# Patient Record
Sex: FEMALE | Race: WHITE | NOT HISPANIC OR LATINO | Employment: OTHER | URBAN - METROPOLITAN AREA
[De-identification: names, ages, dates, MRNs, and addresses within clinical notes are randomized per-mention and may not be internally consistent; named-entity substitution may affect disease eponyms.]

---

## 2017-04-10 ENCOUNTER — OFFICE VISIT (OUTPATIENT)
Dept: NEUROLOGY | Facility: MEDICAL CENTER | Age: 82
End: 2017-04-10
Payer: MEDICARE

## 2017-04-10 VITALS
SYSTOLIC BLOOD PRESSURE: 138 MMHG | DIASTOLIC BLOOD PRESSURE: 70 MMHG | HEIGHT: 62 IN | OXYGEN SATURATION: 96 % | BODY MASS INDEX: 22.34 KG/M2 | TEMPERATURE: 97.2 F | RESPIRATION RATE: 16 BRPM | HEART RATE: 68 BPM | WEIGHT: 121.4 LBS

## 2017-04-10 DIAGNOSIS — G25.0 TREMOR, ESSENTIAL: ICD-10-CM

## 2017-04-10 DIAGNOSIS — R25.1 TREMOR: ICD-10-CM

## 2017-04-10 PROCEDURE — 4004F PT TOBACCO SCREEN RCVD TLK: CPT | Mod: 8P | Performed by: PSYCHIATRY & NEUROLOGY

## 2017-04-10 PROCEDURE — 99214 OFFICE O/P EST MOD 30 MIN: CPT | Performed by: PSYCHIATRY & NEUROLOGY

## 2017-04-10 PROCEDURE — 4040F PNEUMOC VAC/ADMIN/RCVD: CPT | Mod: 8P | Performed by: PSYCHIATRY & NEUROLOGY

## 2017-04-10 PROCEDURE — 1101F PT FALLS ASSESS-DOCD LE1/YR: CPT | Mod: 8P | Performed by: PSYCHIATRY & NEUROLOGY

## 2017-04-10 PROCEDURE — G8420 CALC BMI NORM PARAMETERS: HCPCS | Performed by: PSYCHIATRY & NEUROLOGY

## 2017-04-10 PROCEDURE — G8432 DEP SCR NOT DOC, RNG: HCPCS | Performed by: PSYCHIATRY & NEUROLOGY

## 2017-04-10 RX ORDER — CLONAZEPAM 0.5 MG/1
TABLET ORAL
Qty: 60 TAB | Refills: 5 | Status: SHIPPED | OUTPATIENT
Start: 2017-04-10 | End: 2017-10-10 | Stop reason: SDUPTHER

## 2017-04-10 RX ORDER — GABAPENTIN 100 MG/1
200 CAPSULE ORAL 3 TIMES DAILY
Qty: 180 CAP | Refills: 5 | Status: SHIPPED | OUTPATIENT
Start: 2017-04-10 | End: 2018-05-29

## 2017-04-10 ASSESSMENT — PATIENT HEALTH QUESTIONNAIRE - PHQ9: CLINICAL INTERPRETATION OF PHQ2 SCORE: 0

## 2017-04-10 NOTE — ASSESSMENT & PLAN NOTE
PT HAS HAD A WORSENING OF HER TREMOR. Patient states that the gabapentin and the clonazepam aren't helping as well as the use to. The tremor has interfered with her ability to do certain I hand tasks and crafts. Patient feels like her balance is slightly off but she is practicing this with regular yoga. Patient is very motivated to maintain her independence and strength.

## 2017-04-10 NOTE — MR AVS SNAPSHOT
"Akua Wheeler   4/10/2017 9:40 AM   Office Visit   MRN: [id number]    Department:  Neurology [facility]   Dept Phone:  [phone]    Description:  Female : 1932   Provider:  Melissa P Bloch, M.D.           Reason for Visit     Follow-Up TREMOR      Allergies as of 4/10/2017     Allergen Noted Reactions    Actifed [Triprolidine-Pse] 2013       Bactrim 2013       Epinephrine 2013       Flagyl [Metronidazole Hcl] 2013       Hydrocortisone 2013       Pcn [Penicillins] 2013       Primidone 2014       Propranolol 2014         You were diagnosed with     Tremor, essential   [967680]       Tremor   [[id number]]         Vital Signs     Blood Pressure Pulse Temperature Respirations Height Weight    138/70 mmHg 68 36.2 °C (97.2 °F) 16 1.575 m (5' 2\") 55.067 kg (121 lb 6.4 oz)    Body Mass Index Oxygen Saturation Smoking Status             22.20 kg/m2 96% Never Assessed         Basic Information     Date Of Birth Sex Race Ethnicity Preferred Language    1932 Female White Non- English      Your appointments     Oct 10, 2017  9:40 AM   Follow Up Visit with Melissa P Bloch, M.D.   [facility] (--)    [address] [place] [id number] [phone]           You will be receiving a confirmation call a few days before your appointment from our automated call confirmation system.              Problem List              ICD-10-CM Priority Class Noted - Resolved    Tremor, essential G25.0   3/11/2014 - Present    Accidental fall W19.XXXA   3/17/2016 - Present      Health Maintenance        Date Due Completion Dates    IMM DTaP/Tdap/Td Vaccine (1 - Tdap) 1951 ---    PAP SMEAR 1953 ---    MAMMOGRAM 1972 ---    COLONOSCOPY 1982 ---    IMM ZOSTER VACCINE 1992 ---    BONE DENSITY 1997 ---    IMM PNEUMOCOCCAL 65+ (ADULT) LOW/MEDIUM RISK SERIES (1 of 2 - PCV13) 1997 ---            Current Immunizations     No " immunizations on file.      Below and/or attached are the medications your provider expects you to take. Review all of your home medications and newly ordered medications with your provider and/or pharmacist. Follow medication instructions as directed by your provider and/or pharmacist. Please keep your medication list with you and share with your provider. Update the information when medications are discontinued, doses are changed, or new medications (including over-the-counter products) are added; and carry medication information at all times in the event of emergency situations     Allergies:  ACTIFED - (reactions not documented)     BACTRIM - (reactions not documented)     EPINEPHRINE - (reactions not documented)     FLAGYL - (reactions not documented)     HYDROCORTISONE - (reactions not documented)     PCN - (reactions not documented)     PRIMIDONE - (reactions not documented)     PROPRANOLOL - (reactions not documented)               Medications  Valid as of: April 10, 2017 - 10:13 AM    Generic Name Brand Name Tablet Size Instructions for use    ALPRAZolam (Tab) XANAX 0.25 MG Take 0.25 mg by mouth at bedtime as needed.        ALPRAZolam (Tab) XANAX 0.25 MG Take 1 Tab by mouth at bedtime as needed for Sleep.        Calcium & Magnesium Carbonates   Take  by mouth.        Calcium Carbonate-Vitamin D (Tab) OSCAL-250 250-125 MG-UNIT Take 1 Tab by mouth every day.        Cholecalciferol (Cap) vitamin D3 5000 UNITS Take 1 Cap by mouth every day.        ClonazePAM (Tab) KLONOPIN 0.5 MG Take 0.5 Tabs by mouth 2 times a day.        ClonazePAM (Tab) KLONOPIN 0.5 MG Take 0.5 Tabs by mouth 2 times a day.        ClonazePAM (Tab) KLONOPIN 0.5 MG TAKE 1 TABLET BY MOUTH TWICE A DAY        Gabapentin (Cap) NEURONTIN 100 MG Take 2 Caps by mouth 3 times a day.        Ofloxacin (Solution) OCUFLOX 0.3 %         Omega-3 Fatty Acids   Take  by mouth.        PrednisoLONE Acetate (Suspension) PRED FORTE 1 %         Probiotic Product    Take  by mouth.        .                 Medicines prescribed today were sent to:     [id number] - [place], [place] - [address]    [address] [facility] [place] [address]    Phone: [phone] Fax: [phone]    Open 24 Hours?: No      Medication refill instructions:       If your prescription bottle indicates you have medication refills left, it is not necessary to call your provider’s office. Please contact your pharmacy and they will refill your medication.    If your prescription bottle indicates you do not have any refills left, you may request refills at any time through one of the following ways: The online Splurgy system (except Urgent Care), by calling your provider’s office, or by asking your pharmacy to contact your provider’s office with a refill request. Medication refills are processed only during regular business hours and may not be available until the next business day. Your provider may request additional information or to have a follow-up visit with you prior to refilling your medication.   *Please Note: Medication refills are assigned a new Rx number when refilled electronically. Your pharmacy may indicate that no refills were authorized even though a new prescription for the same medication is available at the pharmacy. Please request the medicine by name with the pharmacy before contacting your provider for a refill.           Splurgy Access Code: [email or web address]  Expires: 5/10/2017  9:29 AM    Splurgy  A secure, online tool to manage your health information     [employer]’s Splurgy® is a secure, online tool that connects you to your personalized health information from the privacy of your home -- day or night - making it very easy for you to manage your healthcare. Once the activation process is completed, you can even access your medical information using the Splurgy julia, which is available for free in the Apple Julia store or Google Play store.     Splurgy provides the following levels  of access (as shown below):   My Chart Features   [facility] Doctor [facility]  Specialists [facility]  Urgent  Care Non-[facility]  Primary Care  Doctor   Email your healthcare team securely and privately 24/7 X X X    Manage appointments: schedule your next appointment; view details of past/upcoming appointments X      Request prescription refills. X      View recent personal medical records, including lab and immunizations X X X X   View health record, including health history, allergies, medications X X X X   Read reports about your outpatient visits, procedures, consult and ER notes X X X X   See your discharge summary, which is a recap of your hospital and/or ER visit that includes your diagnosis, lab results, and care plan. X X       How to register for [employer]end:  1. Go to  [email or web address].  2. Click on the Sign Up Now box, which takes you to the New Member Sign Up page. You will need to provide the following information:  a. Enter your RecoVend Access Code exactly as it appears at the top of this page. (You will not need to use this code after you’ve completed the sign-up process. If you do not sign up before the expiration date, you must request a new code.)   b. Enter your date of birth.   c. Enter your home email address.   d. Click Submit, and follow the next screen’s instructions.  3. Create a RecoVend ID. This will be your RecoVend login ID and cannot be changed, so think of one that is secure and easy to remember.  4. Create a RecoVend password. You can change your password at any time.  5. Enter your Password Reset Question and Answer. This can be used at a later time if you forget your password.   6. Enter your e-mail address. This allows you to receive e-mail notifications when new information is available in RecoVend.  7. Click Sign Up. You can now view your health information.    For assistance activating your RecoVend account, call [phone]

## 2017-04-13 NOTE — PROGRESS NOTES
CHIEF COMPLAINT  Chief Complaint   Patient presents with   • Follow-Up     TREMOR       HPI  Akua Wheeler is a 83 y.o. female who presents for treatment of her tremor.    Tremor, essential  PT HAS HAD A WORSENING OF HER TREMOR. Patient states that the gabapentin and the clonazepam aren't helping as well as the use to. The tremor has interfered with her ability to do certain I hand tasks and crafts. Patient feels like her balance is slightly off but she is practicing this with regular yoga. Patient is very motivated to maintain her independence and strength.      REVIEW OF SYSTEMS  Pertinent Positives:hayfever, daytime somnolence, slight balance issues   All other systems are negative.     PAST MEDICAL HISTORY  Past Medical History   Diagnosis Date   • Allergy    • ASTHMA    • Cancer    • CATARACT    • Hyperlipidemia    • Muscle disorder    • OSTEOPOROSIS    • Thyroid disease        SOCIAL HISTORY  Social History     Social History   • Marital Status: Single     Spouse Name: N/A   • Number of Children: N/A   • Years of Education: N/A     Occupational History   • Not on file.     Social History Main Topics   • Smoking status: Not on file   • Smokeless tobacco: Not on file   • Alcohol Use: Not on file   • Drug Use: Not on file   • Sexual Activity: Not on file     Other Topics Concern   • Not on file     Social History Narrative   • No narrative on file       SURGICAL HISTORY  Past Surgical History   Procedure Laterality Date   • Hernia repair         CURRENT MEDICATIONS  Current Outpatient Prescriptions   Medication Sig Dispense Refill   • gabapentin (NEURONTIN) 100 MG Cap Take 2 Caps by mouth 3 times a day. 180 Cap 5   • clonazepam (KLONOPIN) 0.5 MG Tab TAKE 1 TABLET BY MOUTH TWICE A DAY 60 Tab 5   • Cholecalciferol (VITAMIN D3) 5000 UNITS Cap Take 1 Cap by mouth every day.     • Omega-3 Fatty Acids (OMEGA 3 PO) Take  by mouth.     • Probiotic Product (SOLUBLE FIBER/PROBIOTICS PO) Take  by mouth.     • CALCIUM &  "MAGNESIUM CARBONATES PO Take  by mouth.     • calcium-vitamin D (OSCAL-250) 250-125 MG-UNIT TABS Take 1 Tab by mouth every day.     • clonazepam (KLONOPIN) 0.5 MG TABS Take 0.5 Tabs by mouth 2 times a day. (Patient not taking: Reported on 10/17/2016) 60 Tab 5   • alprazolam (XANAX) 0.25 MG TABS Take 1 Tab by mouth at bedtime as needed for Sleep. (Patient not taking: Reported on 10/17/2016) 30 Tab 1   • ofloxacin (OCUFLOX) 0.3 % SOLN      • prednisoLONE acetate (PRED FORTE) 1 % SUSP      • clonazepam (KLONOPIN) 0.5 MG TABS Take 0.5 Tabs by mouth 2 times a day. 60 Tab 5   • alprazolam (XANAX) 0.25 MG TABS Take 0.25 mg by mouth at bedtime as needed.       No current facility-administered medications for this visit.       ALLERGIES  Allergies   Allergen Reactions   • Actifed [Triprolidine-Pse]    • Bactrim    • Epinephrine    • Flagyl [Metronidazole Hcl]    • Hydrocortisone    • Pcn [Penicillins]    • Primidone    • Propranolol        PHYSICAL EXAM  VITAL SIGNS: /70 mmHg  Pulse 68  Temp(Src) 36.2 °C (97.2 °F)  Resp 16  Ht 1.575 m (5' 2\")  Wt 55.067 kg (121 lb 6.4 oz)  BMI 22.20 kg/m2  SpO2 96%  Constitutional: Well developed, Well nourished, No acute distress, Non-toxic appearance.   HENT: titubation   Eyes: fundi disc sharp, Perrl   Neck: Normal range of motion, No tenderness, Supple, No stridor.   Cardiovascular: Normal heart rate, Normal rhythm, No murmurs, No rubs, No gallops.   Thorax & Lungs: Normal breath sounds, No respiratory distress, No wheezing, No chest tenderness.   Abdomen: Bowel sounds normal, Soft, No tenderness, No masses, No pulsatile masses.   Skin: Warm, Dry, No erythema, No rash.   Back: No tenderness, No CVA tenderness.   Extremities: Intact distal pulses, No edema, No tenderness, No cyanosis, No clubbing.   Neurologic: A&Ox3, CN:2-12 intact with facial tremor, motor: essential tremor in both hands, sensory: intact, CB: no dysmetria, decreased tandem gait   Psychiatric: Affect normal, " Judgment normal, Mood normal.   Lab: Reviewed with patient in detail and as noted in results.      ASSESSMENT AND PLAN  1. Tremor, essential  Refill the clonazepam 0.5mg PO BID and increase her Gabapentin to 200 -300 mg prn voice tremor. Continue with active exercise for good balance maintenance.     I spent 25 minutes with this patient, over fifty percent was spent counseling patient on their condition, best management practices, reviewing test results and risks and benefits of treatment.

## 2017-10-10 ENCOUNTER — OFFICE VISIT (OUTPATIENT)
Dept: NEUROLOGY | Facility: MEDICAL CENTER | Age: 82
End: 2017-10-10
Payer: MEDICARE

## 2017-10-10 VITALS
OXYGEN SATURATION: 94 % | RESPIRATION RATE: 16 BRPM | DIASTOLIC BLOOD PRESSURE: 60 MMHG | WEIGHT: 115 LBS | HEIGHT: 62 IN | HEART RATE: 82 BPM | BODY MASS INDEX: 21.16 KG/M2 | SYSTOLIC BLOOD PRESSURE: 106 MMHG | TEMPERATURE: 98.1 F

## 2017-10-10 DIAGNOSIS — G25.0 TREMOR, ESSENTIAL: ICD-10-CM

## 2017-10-10 DIAGNOSIS — R25.1 TREMOR: ICD-10-CM

## 2017-10-10 PROCEDURE — 99214 OFFICE O/P EST MOD 30 MIN: CPT | Performed by: PSYCHIATRY & NEUROLOGY

## 2017-10-10 RX ORDER — CLONAZEPAM 0.5 MG/1
TABLET ORAL
Qty: 60 TAB | Refills: 5 | Status: SHIPPED | OUTPATIENT
Start: 2017-10-10 | End: 2018-11-27

## 2017-10-10 RX ORDER — NITROFURANTOIN 25; 75 MG/1; MG/1
CAPSULE ORAL
COMMUNITY
Start: 2017-07-20 | End: 2018-11-27

## 2017-10-10 NOTE — PROGRESS NOTES
CHIEF COMPLAINT  Chief Complaint   Patient presents with   • Follow-Up     Tremor       HPI  Akua Wheeler is a 83 y.o. female who presents for treatment of her tremor.    Tremor, essential  Pt has changed the way she takes her medications. Patient is taking the clonazepam in the morning and it works a lot better for her that way. Pt states that the neurontin is too much at TID and she states that she takes it once and she thinks it helps a little at 4 oclock. Pt states she sometimes misses it. She has added the supplement calms and she has tumeric tea which calms her nerves. Pt states that she does yoga once a week and she tries to walk daily. Pt states that she ususally walks for .5 hour. Pt states that she feels good about her exercise.    REVIEW OF SYSTEMS  Pertinent Positives:hayfever, daytime somnolence, slight balance issues but no falls, one UTI since the last visit   All other systems are negative.     PAST MEDICAL HISTORY  Past Medical History:   Diagnosis Date   • Allergy    • ASTHMA    • Cancer (CMS-HCC)    • CATARACT    • Hyperlipidemia    • Muscle disorder    • OSTEOPOROSIS    • Thyroid disease        SOCIAL HISTORY  Social History     Social History   • Marital status: Single     Spouse name: N/A   • Number of children: N/A   • Years of education: N/A     Occupational History   • Not on file.     Social History Main Topics   • Smoking status: Never Smoker   • Smokeless tobacco: Never Used   • Alcohol use Not on file   • Drug use: Unknown   • Sexual activity: Not on file     Other Topics Concern   • Not on file     Social History Narrative   • No narrative on file       SURGICAL HISTORY  Past Surgical History:   Procedure Laterality Date   • HERNIA REPAIR         CURRENT MEDICATIONS  Current Outpatient Prescriptions   Medication Sig Dispense Refill   • clonazepam (KLONOPIN) 0.5 MG Tab TAKE 1 TABLET BY MOUTH TWICE A DAY 60 Tab 5   • gabapentin (NEURONTIN) 100 MG Cap Take 2 Caps by mouth 3 times a day.  "180 Cap 5   • Cholecalciferol (VITAMIN D3) 5000 UNITS Cap Take 1 Cap by mouth every day.     • Omega-3 Fatty Acids (OMEGA 3 PO) Take  by mouth.     • Probiotic Product (SOLUBLE FIBER/PROBIOTICS PO) Take  by mouth.     • CALCIUM & MAGNESIUM CARBONATES PO Take  by mouth.     • nitrofurantoin monohydr macro (MACROBID) 100 MG Cap      • clonazepam (KLONOPIN) 0.5 MG TABS Take 0.5 Tabs by mouth 2 times a day. (Patient not taking: Reported on 10/17/2016) 60 Tab 5   • alprazolam (XANAX) 0.25 MG TABS Take 1 Tab by mouth at bedtime as needed for Sleep. (Patient not taking: Reported on 10/17/2016) 30 Tab 1   • ofloxacin (OCUFLOX) 0.3 % SOLN      • prednisoLONE acetate (PRED FORTE) 1 % SUSP      • clonazepam (KLONOPIN) 0.5 MG TABS Take 0.5 Tabs by mouth 2 times a day. 60 Tab 5   • alprazolam (XANAX) 0.25 MG TABS Take 0.25 mg by mouth at bedtime as needed.     • calcium-vitamin D (OSCAL-250) 250-125 MG-UNIT TABS Take 1 Tab by mouth every day.       No current facility-administered medications for this visit.        ALLERGIES  Allergies   Allergen Reactions   • Actifed [Triprolidine-Pse]    • Bactrim    • Epinephrine    • Flagyl [Metronidazole Hcl]    • Hydrocortisone    • Pcn [Penicillins]    • Primidone    • Propranolol        PHYSICAL EXAM  VITAL SIGNS: /60   Pulse 82   Temp 36.7 °C (98.1 °F)   Resp 16   Ht 1.575 m (5' 2\")   Wt 52.2 kg (115 lb)   SpO2 94%   BMI 21.03 kg/m²   Constitutional: Well developed, Well nourished, No acute distress, Non-toxic appearance.   HENT: titubation   Eyes: fundi disc sharp, Perrl   Neck: Normal range of motion, No tenderness, Supple, No stridor.   Cardiovascular: Normal heart rate, Normal rhythm, No murmurs, No rubs, No gallops.   Thorax & Lungs: Normal breath sounds, No respiratory distress, No wheezing, No chest tenderness.   Abdomen: Bowel sounds normal, Soft, No tenderness, No masses, No pulsatile masses.   Skin: Warm, Dry, No erythema, No rash.   Back: No tenderness, No CVA " tenderness.   Extremities: Intact distal pulses, No edema, No tenderness, No cyanosis, No clubbing.   Neurologic: A&Ox3, CN:2-12 intact with facial tremor, motor: essential tremor in both hands, sensory: intact, CB: no dysmetria, decreased tandem gait   Psychiatric: Affect normal, Judgment normal, Mood normal.   Lab: Reviewed with patient in detail and as noted in results.      ASSESSMENT AND PLAN  1. Tremor, essential  Refill the clonazepam 0.5mg PO BID and decrease her Gabapentin to 100 mg prn voice tremor. Continue with active exercise for good balance maintenance.     I spent 25 minutes with this patient, over fifty percent was spent counseling patient on their condition, best management practices, reviewing test results and risks and benefits of treatment.

## 2017-10-10 NOTE — ASSESSMENT & PLAN NOTE
Pt has changed the way she takes her medications. Patient is taking the clonazepam in the morning and it works a lot better for her that way. Pt states that the neurontin is too much at TID and she states that she takes it once and she thinks it helps a little at 4 oclock. Pt states she sometimes misses it. She has added the supplement calms and she has tumeric tea which calms her nerves. Pt states that she does yoga once a week and she tries to walk daily. Pt states that she ususally walks for .5 hour. Pt states that she feels good about her exercise.

## 2017-10-17 ENCOUNTER — TELEPHONE (OUTPATIENT)
Dept: NEUROLOGY | Facility: MEDICAL CENTER | Age: 82
End: 2017-10-17

## 2017-10-17 NOTE — TELEPHONE ENCOUNTER
Pt is sending this message via [employer] e-mail. Please advise. Thank you.    Non-Urgent Medical Question   Received: 4 days ago   Message Contents   Akua MCINTOSH Neurology [facility]   Phone Number: [phone]             Did you get an answer to when [facility] was starting testing for 23 and me?   Akua [email or web address]

## 2018-04-16 ENCOUNTER — OFFICE VISIT (OUTPATIENT)
Dept: NEUROLOGY | Facility: MEDICAL CENTER | Age: 83
End: 2018-04-16
Payer: MEDICARE

## 2018-04-16 VITALS
BODY MASS INDEX: 21.5 KG/M2 | DIASTOLIC BLOOD PRESSURE: 68 MMHG | HEIGHT: 62 IN | HEART RATE: 73 BPM | TEMPERATURE: 97.6 F | OXYGEN SATURATION: 92 % | WEIGHT: 116.84 LBS | SYSTOLIC BLOOD PRESSURE: 140 MMHG

## 2018-04-16 DIAGNOSIS — R25.9 MIXED ACTION AND RESTING TREMOR: ICD-10-CM

## 2018-04-16 DIAGNOSIS — G25.0 TREMOR, ESSENTIAL: ICD-10-CM

## 2018-04-16 PROCEDURE — 99214 OFFICE O/P EST MOD 30 MIN: CPT | Performed by: PSYCHIATRY & NEUROLOGY

## 2018-04-16 NOTE — PROGRESS NOTES
CHIEF COMPLAINT  Chief Complaint   Patient presents with   • Follow-Up     tremor        HPI  Akua Wheeler is a 83 y.o. female who presents for treatment of her tremor.    Tremor, essential  Pt has had an increase in the tremor on the left side. Pt has also had a change in her balance.    Mixed action and resting tremor  Pt has noticed that her tremor in her left side at rest.    REVIEW OF SYSTEMS  Pertinent Positives:hayfever, daytime somnolence, slight balance issues but no falls, one UTI since the last visit   All other systems are negative.     PAST MEDICAL HISTORY  Past Medical History:   Diagnosis Date   • Allergy    • ASTHMA    • Cancer (CMS-[facility])    • CATARACT    • Hyperlipidemia    • Muscle disorder    • OSTEOPOROSIS    • Thyroid disease        SOCIAL HISTORY  Social History     Social History   • Marital status: Single     Spouse name: N/A   • Number of children: N/A   • Years of education: N/A     Occupational History   • Not on file.     Social History Main Topics   • Smoking status: Never Smoker   • Smokeless tobacco: Never Used   • Alcohol use Not on file   • Drug use: Unknown   • Sexual activity: Not on file     Other Topics Concern   • Not on file     Social History Narrative   • No narrative on file       SURGICAL HISTORY  Past Surgical History:   Procedure Laterality Date   • HERNIA REPAIR         CURRENT MEDICATIONS  Current Outpatient Prescriptions   Medication Sig Dispense Refill   • carbidopa-levodopa (SINEMET)  MG Tab Take 0.5 Tabs by mouth 3 times a day as needed. 45 Tab 0   • clonazepam (KLONOPIN) 0.5 MG Tab TAKE 1 TABLET BY MOUTH TWICE A DAY 60 Tab 5   • Cholecalciferol (VITAMIN D3) 5000 UNITS Cap Take 1 Cap by mouth every day.     • Omega-3 Fatty Acids (OMEGA 3 PO) Take  by mouth.     • Probiotic Product (SOLUBLE FIBER/PROBIOTICS PO) Take  by mouth.     • CALCIUM & MAGNESIUM CARBONATES PO Take  by mouth.     • calcium-vitamin D (OSCAL-250) 250-125 MG-UNIT TABS Take 1 Tab by mouth  "every day.     • nitrofurantoin monohydr macro (MACROBID) 100 MG Cap      • gabapentin (NEURONTIN) 100 MG Cap Take 2 Caps by mouth 3 times a day. 180 Cap 5   • clonazepam (KLONOPIN) 0.5 MG TABS Take 0.5 Tabs by mouth 2 times a day. (Patient not taking: Reported on 10/17/2016) 60 Tab 5   • alprazolam (XANAX) 0.25 MG TABS Take 1 Tab by mouth at bedtime as needed for Sleep. (Patient not taking: Reported on 10/17/2016) 30 Tab 1   • ofloxacin (OCUFLOX) 0.3 % SOLN      • prednisoLONE acetate (PRED FORTE) 1 % SUSP      • clonazepam (KLONOPIN) 0.5 MG TABS Take 0.5 Tabs by mouth 2 times a day. 60 Tab 5   • alprazolam (XANAX) 0.25 MG TABS Take 0.25 mg by mouth at bedtime as needed.       No current facility-administered medications for this visit.        ALLERGIES  Allergies   Allergen Reactions   • Actifed [Triprolidine-Pse]    • Bactrim    • Epinephrine    • Flagyl [Metronidazole Hcl]    • Hydrocortisone    • Pcn [Penicillins]    • Primidone    • Propranolol        PHYSICAL EXAM  VITAL SIGNS: /68   Pulse 73   Temp 36.4 °C (97.6 °F)   Ht 1.575 m (5' 2\")   Wt 53 kg (116 lb 13.5 oz)   SpO2 92%   BMI 21.37 kg/m²   Constitutional: Well developed, Well nourished, No acute distress, Non-toxic appearance.   HENT: titubation   Eyes: fundi disc sharp, Perrl   Neck: Normal range of motion, No tenderness, Supple, No stridor.   Cardiovascular: Normal heart rate, Normal rhythm, No murmurs, No rubs, No gallops.   Thorax & Lungs: Normal breath sounds, No respiratory distress, No wheezing, No chest tenderness.   Abdomen: Bowel sounds normal, Soft, No tenderness, No masses, No pulsatile masses.   Skin: Warm, Dry, No erythema, No rash.   Back: No tenderness, No CVA tenderness.   Extremities: Intact distal pulses, No edema, No tenderness, No cyanosis, No clubbing.   Neurologic: A&Ox3, CN:2-12 intact with facial tremor, motor: essential tremor in both hands, sensory: intact, CB: no dysmetria, decreased tandem gait   Psychiatric: " Affect normal, Judgment normal, Mood normal.   Lab: Reviewed with patient in detail and as noted in results.      ASSESSMENT AND PLAN  1. Tremor, essential  Refill the clonazepam 0.5mg PO BID and decrease her Gabapentin to 100 mg prn voice tremor. Continue with active exercise for good balance maintenance.    2. Mixed action and resting tremor  Patient complains of new increasing tremor in her left side hand which she notices more at rest. Patient also states her equilibrium has been off. She has an increase in her symptoms with anxiety. I plan to try small dose of carbidopa levodopa 25 100 one half pill 30 minutes prior to meals 1-3 times a day as necessary. Patient will keep a diary of how this medication affects especially the resting component of her tremor. If this medication doesn't help we discussed having a MRI of the brain to look for structural cause of the new left-sided tremor. Patient does not remember a sudden onset of the tremor but said it has occurred gradually.     I spent 25 minutes with this patient face-to-face, over fifty percent was spent counseling patient on their condition, best management practices, reviewing test results and risks and benefits of treatment.

## 2018-05-16 ENCOUNTER — TELEPHONE (OUTPATIENT)
Dept: NEUROLOGY | Facility: MEDICAL CENTER | Age: 83
End: 2018-05-16

## 2018-05-16 NOTE — TELEPHONE ENCOUNTER
Akua MCINTOSH Neurology [facility]   Phone Number: [phone]             MRI was done at [facility] on [address] today, Wednesday, May 16, 2018 at 9 a.m.     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  Creatinine lab ordered for May 16,2018.     Akua Wheeler   02-      [facility]

## 2018-05-29 ENCOUNTER — OFFICE VISIT (OUTPATIENT)
Dept: NEUROLOGY | Facility: MEDICAL CENTER | Age: 83
End: 2018-05-29
Payer: MEDICARE

## 2018-05-29 VITALS
HEART RATE: 76 BPM | DIASTOLIC BLOOD PRESSURE: 60 MMHG | SYSTOLIC BLOOD PRESSURE: 130 MMHG | BODY MASS INDEX: 20.8 KG/M2 | HEIGHT: 62 IN | WEIGHT: 113 LBS

## 2018-05-29 DIAGNOSIS — C71.9 LOW GRADE GLIOMA OF BRAIN (HCC): ICD-10-CM

## 2018-05-29 DIAGNOSIS — G25.0 TREMOR, ESSENTIAL: ICD-10-CM

## 2018-05-29 DIAGNOSIS — R25.9 MIXED ACTION AND RESTING TREMOR: ICD-10-CM

## 2018-05-29 PROCEDURE — 99214 OFFICE O/P EST MOD 30 MIN: CPT | Performed by: PSYCHIATRY & NEUROLOGY

## 2018-05-30 NOTE — ASSESSMENT & PLAN NOTE
Patient states she tried the carbidopa levodopa which did nothing for the tremor. This is confirmed the essential tremors most likely diagnosis of all her issues.

## 2018-05-30 NOTE — ASSESSMENT & PLAN NOTE
Patient comes in with report from recent MRI which was done at [facility] which showed concern for a 3.6 cm brain lesion in the left basal ganglia region. It did not enhance and radiologist's differential put low-grade glioma the top of the list. Patient denies any headache, change in balance, motor problems or any other symptoms attributed to this area.

## 2018-05-30 NOTE — PROGRESS NOTES
CHIEF COMPLAINT  Chief Complaint   Patient presents with   • Tremors     Abnormal MRI       HPI  Akua Wheeler is a 83 y.o. female who presents for review of her recent MRI scan of the brain and treatment of her tremor.    Tremor, essential  Pt states that overall she can do what she wants to do.    Low grade glioma of brain (HCC)  Patient comes in with report from recent MRI which was done at [facility] which showed concern for a 3.6 cm brain lesion in the left basal ganglia region. It did not enhance and radiologist's differential put low-grade glioma the top of the list. Patient denies any headache, change in balance, motor problems or any other symptoms attributed to this area.    Mixed action and resting tremor  Patient states she tried the carbidopa levodopa which did nothing for the tremor. This is confirmed the essential tremors most likely diagnosis of all her issues.    REVIEW OF SYSTEMS  Pertinent Positives:hayfever, daytime somnolence, slight balance issues but no falls, anxiety   All other systems are negative.     PAST MEDICAL HISTORY  Past Medical History:   Diagnosis Date   • Allergy    • ASTHMA    • Cancer (HCC)    • CATARACT    • Hyperlipidemia    • Muscle disorder    • OSTEOPOROSIS    • Thyroid disease        SOCIAL HISTORY  Social History     Social History   • Marital status: Single     Spouse name: N/A   • Number of children: N/A   • Years of education: N/A     Occupational History   • Not on file.     Social History Main Topics   • Smoking status: Never Smoker   • Smokeless tobacco: Never Used   • Alcohol use Not on file   • Drug use: Unknown   • Sexual activity: Not on file     Other Topics Concern   • Not on file     Social History Narrative   • No narrative on file       SURGICAL HISTORY  Past Surgical History:   Procedure Laterality Date   • HERNIA REPAIR         CURRENT MEDICATIONS  Current Outpatient Prescriptions   Medication Sig Dispense Refill   • nitrofurantoin monohydr macro  "(MACROBID) 100 MG Cap      • clonazepam (KLONOPIN) 0.5 MG Tab TAKE 1 TABLET BY MOUTH TWICE A DAY 60 Tab 5   • Cholecalciferol (VITAMIN D3) 5000 UNITS Cap Take 1 Cap by mouth every day.     • Omega-3 Fatty Acids (OMEGA 3 PO) Take  by mouth.     • Probiotic Product (SOLUBLE FIBER/PROBIOTICS PO) Take  by mouth.     • CALCIUM & MAGNESIUM CARBONATES PO Take  by mouth.     • alprazolam (XANAX) 0.25 MG TABS Take 1 Tab by mouth at bedtime as needed for Sleep. 30 Tab 1   • ofloxacin (OCUFLOX) 0.3 % SOLN      • prednisoLONE acetate (PRED FORTE) 1 % SUSP      • alprazolam (XANAX) 0.25 MG TABS Take 0.25 mg by mouth at bedtime as needed.     • calcium-vitamin D (OSCAL-250) 250-125 MG-UNIT TABS Take 1 Tab by mouth every day.       No current facility-administered medications for this visit.        ALLERGIES  Allergies   Allergen Reactions   • Actifed [Triprolidine-Pse]    • Bactrim    • Epinephrine    • Flagyl [Metronidazole Hcl]    • Hydrocortisone    • Pcn [Penicillins]    • Primidone    • Propranolol        PHYSICAL EXAM  VITAL SIGNS: /60   Pulse 76   Ht 1.575 m (5' 2\")   Wt 51.3 kg (113 lb)   BMI 20.67 kg/m²   Constitutional: Well developed, Well nourished, No acute distress, Non-toxic appearance.   HENT: titubation   Eyes: fundi disc sharp, Perrl   Neck: Normal range of motion, No tenderness, Supple, No stridor.   Cardiovascular: Normal heart rate, Normal rhythm, No murmurs, No rubs, No gallops.   Thorax & Lungs: Normal breath sounds, No respiratory distress, No wheezing, No chest tenderness.   Abdomen: Bowel sounds normal, Soft, No tenderness, No masses, No pulsatile masses.   Skin: Warm, Dry, No erythema, No rash.   Back: No tenderness, No CVA tenderness.   Extremities: Intact distal pulses, No edema, No tenderness, No cyanosis, No clubbing.   Neurologic: A&Ox3, CN:2-12 intact with facial tremor, motor: essential tremor in both hands, sensory: intact, CB: no dysmetria, decreased tandem gait   Psychiatric: Affect " normal, Judgment normal, Mood normal.   Lab: Reviewed with patient in detail and as noted in results.      ASSESSMENT AND PLAN  1. Tremor, essential  Refill the clonazepam 0.5mg PO BID and decrease her Gabapentin to 100 mg prn voice tremor. Continue with active exercise for good balance maintenance.    2. Mixed action and resting tremor  Patient did not respond to carbidopa levodopa which makes me doubt any parkinsonism as the cause of her tremor.    3. Low-grade glioma  MRI of the brain most suggestive of low-grade glioma. Patient I discussed that it would be helpful to get a opinion on whether a biopsy or further imaging studies are necessary from neurosurgery. Therefore I have referred patient to Dr. FRANC Castro at [facility] neurosurgery to better help evaluate this new abnormality seen on her MRI scan.     I spent 25 minutes with this patient and her daughter face-to-face, over fifty percent was spent counseling patient on their condition, best management practices, reviewing test results and risks and benefits of treatment.

## 2018-08-10 ENCOUNTER — TELEPHONE (OUTPATIENT)
Dept: NEUROLOGY | Facility: MEDICAL CENTER | Age: 83
End: 2018-08-10

## 2018-08-10 NOTE — TELEPHONE ENCOUNTER
Akua MCINTOSH Neurology [facility]   Phone Number: [phone]             My last MRI on 7- was done at [facility].  I hope you will be able to access it.   Akua Wheeler   02-     ROXANA

## 2018-10-10 ENCOUNTER — HOSPITAL ENCOUNTER (OUTPATIENT)
Dept: RADIOLOGY | Facility: MEDICAL CENTER | Age: 83
End: 2018-10-10

## 2018-11-27 ENCOUNTER — OFFICE VISIT (OUTPATIENT)
Dept: NEUROLOGY | Facility: MEDICAL CENTER | Age: 83
End: 2018-11-27
Payer: MEDICARE

## 2018-11-27 VITALS
SYSTOLIC BLOOD PRESSURE: 142 MMHG | WEIGHT: 114 LBS | OXYGEN SATURATION: 94 % | BODY MASS INDEX: 20.98 KG/M2 | HEIGHT: 62 IN | DIASTOLIC BLOOD PRESSURE: 80 MMHG | HEART RATE: 86 BPM | TEMPERATURE: 97.7 F

## 2018-11-27 DIAGNOSIS — R25.9 MIXED ACTION AND RESTING TREMOR: ICD-10-CM

## 2018-11-27 DIAGNOSIS — G25.0 TREMOR, ESSENTIAL: ICD-10-CM

## 2018-11-27 DIAGNOSIS — C71.9 LOW GRADE GLIOMA OF BRAIN (HCC): ICD-10-CM

## 2018-11-27 PROCEDURE — 99214 OFFICE O/P EST MOD 30 MIN: CPT | Performed by: PSYCHIATRY & NEUROLOGY

## 2018-11-27 ASSESSMENT — PATIENT HEALTH QUESTIONNAIRE - PHQ9: CLINICAL INTERPRETATION OF PHQ2 SCORE: 0

## 2018-11-27 NOTE — ASSESSMENT & PLAN NOTE
Pt stopped the clonazepam and she feels like the tremor is not too much worse. Pt does walking and yoga.

## 2018-11-27 NOTE — PROGRESS NOTES
CHIEF COMPLAINT  Chief Complaint   Patient presents with   • Follow-Up     tremor       HPI  Akua Wheeler is a 83 y.o. female who presents for review of her recent MRI scan of the brain which showed concern for a low grade glioma and treatment of her tremor.    Low grade glioma of brain (HCC)  Pt needs to have a spectroscopy on Thursday as the brain scan looks like an astrocytoma.    Tremor, essential  Pt stopped the clonazepam and she feels like the tremor is not too much worse. Pt does walking and yoga.    Mixed action and resting tremor  Pt does not want any medications for this tremor.    REVIEW OF SYSTEMS  Pertinent Positives:hayfever, daytime somnolence, slight balance issues but no falls, anxiety   All other systems are negative.     PAST MEDICAL HISTORY  Past Medical History:   Diagnosis Date   • Allergy    • ASTHMA    • Cancer (HCC)    • CATARACT    • Hyperlipidemia    • Muscle disorder    • OSTEOPOROSIS    • Thyroid disease        SOCIAL HISTORY  Social History     Social History   • Marital status: Single     Spouse name: N/A   • Number of children: N/A   • Years of education: N/A     Occupational History   • Not on file.     Social History Main Topics   • Smoking status: Never Smoker   • Smokeless tobacco: Never Used   • Alcohol use Not on file   • Drug use: Unknown   • Sexual activity: Not on file     Other Topics Concern   • Not on file     Social History Narrative   • No narrative on file       SURGICAL HISTORY  Past Surgical History:   Procedure Laterality Date   • HERNIA REPAIR         CURRENT MEDICATIONS  Current Outpatient Prescriptions   Medication Sig Dispense Refill   • Cholecalciferol (VITAMIN D3) 5000 UNITS Cap Take 1 Cap by mouth every day.     • Omega-3 Fatty Acids (OMEGA 3 PO) Take  by mouth.     • Probiotic Product (SOLUBLE FIBER/PROBIOTICS PO) Take  by mouth.     • CALCIUM & MAGNESIUM CARBONATES PO Take  by mouth.     • calcium-vitamin D (OSCAL-250) 250-125 MG-UNIT TABS Take 1 Tab by  "mouth every day.       No current facility-administered medications for this visit.        ALLERGIES  Allergies   Allergen Reactions   • Actifed [Triprolidine-Pse]    • Bactrim    • Epinephrine    • Flagyl [Metronidazole Hcl]    • Hydrocortisone    • Pcn [Penicillins]    • Primidone    • Propranolol        PHYSICAL EXAM  VITAL SIGNS: /80 (BP Location: Left arm, Patient Position: Sitting, BP Cuff Size: Adult)   Pulse 86   Temp 36.5 °C (97.7 °F) (Temporal)   Ht 1.575 m (5' 2\")   Wt 51.7 kg (114 lb)   SpO2 94%   BMI 20.85 kg/m²   Constitutional: Well developed, Well nourished, No acute distress, Non-toxic appearance.   HENT: titubation   Eyes: fundi disc sharp, Perrl   Neck: Normal range of motion, No tenderness, Supple, No stridor.   Cardiovascular: Normal heart rate, Normal rhythm, No murmurs, No rubs, No gallops.   Thorax & Lungs: Normal breath sounds, No respiratory distress, No wheezing, No chest tenderness.   Abdomen: Bowel sounds normal, Soft, No tenderness, No masses, No pulsatile masses.   Skin: Warm, Dry, No erythema, No rash.   Back: No tenderness, No CVA tenderness.   Extremities: Intact distal pulses, No edema, No tenderness, No cyanosis, No clubbing.   Neurologic: A&Ox3, CN:2-12 intact with facial tremor, motor: essential tremor in both hands, sensory: intact, CB: no dysmetria, decreased tandem gait   Psychiatric: Affect normal, Judgment normal, Mood normal.   Lab: Reviewed with patient in detail and as noted in results.      ASSESSMENT AND PLAN  1. Tremor, essential  Refill the clonazepam 0.5mg PO BID and decrease her Gabapentin to 100 mg prn voice tremor. Continue with active exercise for good balance maintenance.    2. Mixed action and resting tremor  Patient did not respond to carbidopa levodopa which makes me doubt any parkinsonism as the cause of her tremor.    3. Low-grade glioma  MRI of the brain most suggestive of low-grade glioma. Patient I discussed that it would be helpful to get a " opinion on whether a biopsy or further imaging studies are necessary from neurosurgery.  Patient to  See Dr. FRANC Castro at [facility] neurosurgery to better help evaluate this abnormality seen on her MRI scan after the spect scan.     I spent 25 minutes with this patient and her daughter face-to-face, over fifty percent was spent counseling patient on their condition, best management practices, reviewing test results and risks and benefits of treatment.

## 2018-12-19 ENCOUNTER — TELEPHONE (OUTPATIENT)
Dept: NEUROLOGY | Facility: MEDICAL CENTER | Age: 83
End: 2018-12-19

## 2018-12-20 NOTE — TELEPHONE ENCOUNTER
Akua Wheeler  P Neurology [facility]   Phone Number: [phone]             Hi Dr Amin - Did you review the MRI Spectroscopy CD I left at the  on 11-?     RE Clonazepam - I am back at taking half a 0.50 pill daily.  Works well for me.     I have a doctor's appointment with Dr. Judith Pham at [facility] in [place]   on Friday, December 28, 2018 for second opinion re brain tumor.     Akua Frederick

## 2018-12-20 NOTE — TELEPHONE ENCOUNTER
Yes, I reviewed the spectroscopy and notes from Dr. Castro. I agree that a second opinion from [facility] is a great idea and I am glad you have an appointment with them next week. Please let me know what they think.     Merry Christmas, Dr. Bloch

## 2019-01-14 ENCOUNTER — APPOINTMENT (RX ONLY)
Dept: URBAN - METROPOLITAN AREA CLINIC 20 | Facility: CLINIC | Age: 84
Setting detail: DERMATOLOGY
End: 2019-01-14

## 2019-01-14 DIAGNOSIS — D22 MELANOCYTIC NEVI: ICD-10-CM

## 2019-01-14 DIAGNOSIS — L81.4 OTHER MELANIN HYPERPIGMENTATION: ICD-10-CM

## 2019-01-14 PROBLEM — E03.9 HYPOTHYROIDISM, UNSPECIFIED: Status: ACTIVE | Noted: 2019-01-14

## 2019-01-14 PROBLEM — Z85.828 PERSONAL HISTORY OF OTHER MALIGNANT NEOPLASM OF SKIN: Status: ACTIVE | Noted: 2019-01-14

## 2019-01-14 PROBLEM — J45.909 UNSPECIFIED ASTHMA, UNCOMPLICATED: Status: ACTIVE | Noted: 2019-01-14

## 2019-01-14 PROBLEM — D22.39 MELANOCYTIC NEVI OF OTHER PARTS OF FACE: Status: ACTIVE | Noted: 2019-01-14

## 2019-01-14 PROBLEM — D22.5 MELANOCYTIC NEVI OF TRUNK: Status: ACTIVE | Noted: 2019-01-14

## 2019-01-14 PROBLEM — E78.5 HYPERLIPIDEMIA, UNSPECIFIED: Status: ACTIVE | Noted: 2019-01-14

## 2019-01-14 PROCEDURE — 99203 OFFICE O/P NEW LOW 30 MIN: CPT

## 2019-01-14 PROCEDURE — ? OBSERVATION AND MEASURE

## 2019-01-14 PROCEDURE — ? COUNSELING

## 2019-01-14 PROCEDURE — ? SUNSCREEN RECOMMENDATIONS

## 2019-01-14 ASSESSMENT — LOCATION SIMPLE DESCRIPTION DERM
LOCATION SIMPLE: LEFT FOREHEAD
LOCATION SIMPLE: RIGHT UPPER BACK
LOCATION SIMPLE: RIGHT CHEEK

## 2019-01-14 ASSESSMENT — LOCATION ZONE DERM
LOCATION ZONE: FACE
LOCATION ZONE: TRUNK

## 2019-01-14 ASSESSMENT — LOCATION DETAILED DESCRIPTION DERM
LOCATION DETAILED: LEFT INFERIOR FOREHEAD
LOCATION DETAILED: RIGHT SUPERIOR MEDIAL UPPER BACK
LOCATION DETAILED: RIGHT MEDIAL MALAR CHEEK

## 2019-03-07 ENCOUNTER — HOSPITAL ENCOUNTER (OUTPATIENT)
Dept: RADIOLOGY | Facility: MEDICAL CENTER | Age: 84
End: 2019-03-07

## 2019-03-07 ENCOUNTER — HOSPITAL ENCOUNTER (OUTPATIENT)
Dept: RADIATION ONCOLOGY | Facility: MEDICAL CENTER | Age: 84
End: 2019-03-31
Attending: RADIOLOGY
Payer: MEDICARE

## 2019-03-07 ENCOUNTER — APPOINTMENT (OUTPATIENT)
Dept: RADIATION ONCOLOGY | Facility: MEDICAL CENTER | Age: 84
End: 2019-03-07
Attending: RADIOLOGY
Payer: MEDICARE

## 2019-03-07 VITALS
HEART RATE: 75 BPM | WEIGHT: 117.2 LBS | TEMPERATURE: 98.4 F | DIASTOLIC BLOOD PRESSURE: 68 MMHG | HEIGHT: 62 IN | SYSTOLIC BLOOD PRESSURE: 128 MMHG | OXYGEN SATURATION: 96 % | BODY MASS INDEX: 21.57 KG/M2

## 2019-03-07 DIAGNOSIS — C71.9 LOW GRADE GLIOMA OF BRAIN (HCC): ICD-10-CM

## 2019-03-07 PROCEDURE — 99214 OFFICE O/P EST MOD 30 MIN: CPT | Performed by: RADIOLOGY

## 2019-03-07 PROCEDURE — 99205 OFFICE O/P NEW HI 60 MIN: CPT | Performed by: RADIOLOGY

## 2019-03-07 RX ORDER — CLONAZEPAM 0.5 MG/1
0.25 TABLET ORAL 2 TIMES DAILY
COMMUNITY
End: 2019-05-14 | Stop reason: SDUPTHER

## 2019-03-07 ASSESSMENT — PAIN SCALES - GENERAL: PAINLEVEL: NO PAIN

## 2019-03-07 NOTE — CONSULTS
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 Wheeler today in consultation at the request of Dr. Pham for her brain tumor.  Patient is an 87-year-old woman who has a 20-year history of essential tremor.  She began to experience new disequilibrium and increased left-sided tremor.  When this failed conservative measures further workup was followed.  In May 2018 an MRI was done that revealed a minimally enhancing mass in the left frontal lobe involving the basal ganglia.  Serial imaging showed relative stability in this lesion in July September and November.  She sought opinion at [facility] and repeat imaging was done in January 2019.  This continues to show roughly 5.5 cm nonenhancing infiltrative lesion in the left frontal lobe.  There was also a second area of patchy enhancement in the left thalamus difficult to characterize.  No specific FLAIR sequence abnormality was characterized in this area.  Patient was seen by the [facility] neuro-oncology team who is recommending hypo-fractionated radiotherapy in combination with temozolomide followed by adjuvant temozolomide.  She is here today to further discuss radiotherapy in the management of her tumor.    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   • Anaplastic astrocytoma (HCC)     bx 2/14/19   • ASTHMA    • Cancer (HCC)    • CATARACT    • Hyperlipidemia    • Muscle disorder    • OSTEOPOROSIS    • Shingles    • Thyroid disease        PAST SURGICAL HISTORY:  Past Surgical History:   Procedure Laterality Date   • CATARACT EXTRACTION WITH IOL     • HERNIA REPAIR     • HERNIA REPAIR     • OTHER      resection for anal cancer 2009   • OTHER      MRI directed sterotactic biopsy of left  "sided brain mass ([place]) 2/14/19       CURRENT MEDICATIONS:  Current Outpatient Prescriptions   Medication Sig Dispense Refill   • clonazePAM (KLONOPIN) 0.5 MG Tab Take 0.25 mg by mouth 2 times a day.     • Non Formulary Request      • CALCIUM & MAGNESIUM CARBONATES PO Take  by mouth.     • Cholecalciferol (VITAMIN D3) 5000 UNITS Cap Take 1 Cap by mouth every day.     • Omega-3 Fatty Acids (OMEGA 3 PO) Take  by mouth.     • Probiotic Product (SOLUBLE FIBER/PROBIOTICS PO) Take  by mouth.     • calcium-vitamin D (OSCAL-250) 250-125 MG-UNIT TABS Take 1 Tab by mouth every day.       No current facility-administered medications for this encounter.        ALLERGIES:    Actifed [triprolidine-pse]; Bactrim; Epinephrine; Flagyl [metronidazole hcl]; Hydrocortisone; Pcn [penicillins]; Primidone; and Propranolol    FAMILY HISTORY:    No family cancer history    SOCIAL HISTORY:    Social History   Substance Use Topics   • Smoking status: Never Smoker   • Smokeless tobacco: Never Used   • Alcohol use No     Patient is , lives in [place], has 4 children and is a retired .    REVIEW OF SYSTEMS:  A complete review of systems was completed in patient's chart on 3/7/2019.  All are negative with relationship to this diagnosis with the exception of:  Patient had had an essential tremor for nearly 20 years which is relatively stable, she does not have any focal neurologic deficits related to this tumor.  She is an otherwise healthy 87-year-old woman, she did have a remote history of anal cancer treated surgically in 2009    PHYSICAL EXAM:    Vitals:    03/07/19 1328   BP: 128/68   Pulse: 75   Temp: 36.9 °C (98.4 °F)   SpO2: 96%   Weight: 53.2 kg (117 lb 3.2 oz)   Height: 1.575 m (5' 2\")          1= Restricted in physically strenuous activity, but ambulatory and able to carry out work of a light sedentary nature, e.g., light housework, office work.    Pain Scale: 0-10  Pain Assessement: 0  Pain Location, " Orientation and Scale: no complaints of pain.      Gynecological History:   & Para:   : 4, Para: 4 and Number of Interrupted Pregnancies: 0    Menopause Status: post    Reason For Menopause: Natural    Gynecological comments: no prior HRT use    GENERAL: No apparent distress.  HEENT:  Pupils are equal, round, and reactive to light.  Extraocular muscles   are intact. Sclerae nonicteric.  Conjunctivae pink.  Oral cavity, tongue   protrudes midline.   NECK:  Supple without evidence of thyromegaly.  NODES:  No peripheral adenopathy of the neck, supraclavicular fossa or axillae   bilaterally.  LUNGS:  Clear to ascultation bilaterally   HEART:  Regular rate and rhythm.  No murmur appreciated  ABDOMEN:  Soft. No evidence of hepatosplenomegaly.  Positive bowel sounds.  EXTREMITIES:  Without Edema.  NEUROLOGIC:  Cranial nerves II through XII were intact. 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  Discussed with patient and her daughter to the natural history and progression of brain tumors.  More specifically we discussed low-grade versus high-grade lesions.  Specifically we discussed anaplastic astrocytoma.  We discussed natural history with or without treatment.  Our plan is to proceed with hypo-fractionated radiotherapy over 15 fractions to the partial brain in combination with temozolomide followed by adjuvant temozolomide.  Unfortunately patient has not yet established medical oncology care in [place].  We have contacted the medical oncology office and they are searching to see if a referral has been placed otherwise I would be happy to place one.  We discussed that the temozolomide  will often take several weeks to receive from the centralized pharmacy.  In the interim I have suggested we proceed with her simulation on Monday at 9 AM.    We discussed the risks, benefits and side effects of treatment and the patient is amenable to treatment.  If patient has any questions or concerns, she should feel free to contact me.    Thank you for the opportunity to participate in her care.  If any questions or comments, please do not hesitate in calling.

## 2019-03-07 NOTE — ADDENDUM NOTE
Encounter addended by: Kait Vela R.N. on: 3/7/2019  3:39 PM<BR>    Actions taken: Visit diagnoses modified, Order list changed, Diagnosis association updated

## 2019-03-07 NOTE — NON-PROVIDER
"Patient was seen today in clinic with Dr. Cabrales for Anaplastic Astrocytoma.  Vitals signs and weight were obtained and pain assessment was completed.  Allergies and medications were reviewed with the patient.  Review of systems completed.     Vitals/Pain:  Vitals:    03/07/19 1328   BP: 128/68   Pulse: 75   Temp: 36.9 °C (98.4 °F)   SpO2: 96%   Weight: 53.2 kg (117 lb 3.2 oz)   Height: 1.575 m (5' 2\")   Pain Score: No pain        Allergies:   Actifed [triprolidine-pse]; Bactrim; Epinephrine; Flagyl [metronidazole hcl]; Hydrocortisone; Pcn [penicillins]; Primidone; and Propranolol    Current Medications:  Current Outpatient Prescriptions   Medication Sig Dispense Refill   • clonazePAM (KLONOPIN) 0.5 MG Tab Take 0.25 mg by mouth 2 times a day.     • Non Formulary Request      • CALCIUM & MAGNESIUM CARBONATES PO Take  by mouth.     • Cholecalciferol (VITAMIN D3) 5000 UNITS Cap Take 1 Cap by mouth every day.     • Omega-3 Fatty Acids (OMEGA 3 PO) Take  by mouth.     • Probiotic Product (SOLUBLE FIBER/PROBIOTICS PO) Take  by mouth.     • calcium-vitamin D (OSCAL-250) 250-125 MG-UNIT TABS Take 1 Tab by mouth every day.       No current facility-administered medications for this encounter.          PCP:  Jhoan Vela R.N.  "

## 2019-03-08 ENCOUNTER — HOSPITAL ENCOUNTER (OUTPATIENT)
Dept: LAB | Facility: MEDICAL CENTER | Age: 84
End: 2019-03-08
Attending: RADIOLOGY
Payer: MEDICARE

## 2019-03-08 DIAGNOSIS — C71.9 LOW GRADE GLIOMA OF BRAIN (HCC): ICD-10-CM

## 2019-03-08 LAB
ANION GAP SERPL CALC-SCNC: 6 MMOL/L (ref 0–11.9)
BUN SERPL-MCNC: 12 MG/DL (ref 8–22)
CALCIUM SERPL-MCNC: 9.3 MG/DL (ref 8.5–10.5)
CHLORIDE SERPL-SCNC: 102 MMOL/L (ref 96–112)
CO2 SERPL-SCNC: 26 MMOL/L (ref 20–33)
CREAT SERPL-MCNC: 0.82 MG/DL (ref 0.5–1.4)
FASTING STATUS PATIENT QL REPORTED: NORMAL
GLUCOSE SERPL-MCNC: 93 MG/DL (ref 65–99)
POTASSIUM SERPL-SCNC: 4.1 MMOL/L (ref 3.6–5.5)
SODIUM SERPL-SCNC: 134 MMOL/L (ref 135–145)

## 2019-03-08 PROCEDURE — 36415 COLL VENOUS BLD VENIPUNCTURE: CPT

## 2019-03-08 PROCEDURE — 80048 BASIC METABOLIC PNL TOTAL CA: CPT

## 2019-03-11 ENCOUNTER — HOSPITAL ENCOUNTER (OUTPATIENT)
Dept: RADIATION ONCOLOGY | Facility: MEDICAL CENTER | Age: 84
End: 2019-03-11

## 2019-03-11 DIAGNOSIS — C71.9 LOW GRADE GLIOMA OF BRAIN (HCC): ICD-10-CM

## 2019-03-11 PROCEDURE — 77290 THER RAD SIMULAJ FIELD CPLX: CPT | Mod: 26 | Performed by: RADIOLOGY

## 2019-03-11 PROCEDURE — 77263 THER RADIOLOGY TX PLNG CPLX: CPT | Performed by: RADIOLOGY

## 2019-03-11 PROCEDURE — 77334 RADIATION TREATMENT AID(S): CPT | Mod: 26 | Performed by: RADIOLOGY

## 2019-03-11 PROCEDURE — 77334 RADIATION TREATMENT AID(S): CPT | Performed by: RADIOLOGY

## 2019-03-11 PROCEDURE — 77470 SPECIAL RADIATION TREATMENT: CPT | Mod: 26 | Performed by: RADIOLOGY

## 2019-03-11 PROCEDURE — 77290 THER RAD SIMULAJ FIELD CPLX: CPT | Performed by: RADIOLOGY

## 2019-03-11 PROCEDURE — 77470 SPECIAL RADIATION TREATMENT: CPT | Performed by: RADIOLOGY

## 2019-03-12 ENCOUNTER — TELEPHONE (OUTPATIENT)
Dept: HEMATOLOGY ONCOLOGY | Facility: MEDICAL CENTER | Age: 84
End: 2019-03-12

## 2019-03-12 RX ORDER — TEMOZOLOMIDE 100 MG/1
CAPSULE ORAL
Qty: 15 CAP | Refills: 0 | Status: SHIPPED | OUTPATIENT
Start: 2019-03-12

## 2019-03-12 NOTE — TELEPHONE ENCOUNTER
New UrgentTemodar RX sent to [facility].    Temodar 100mg PO QD M-F x3 weeks with radiation.

## 2019-03-13 ENCOUNTER — TELEPHONE (OUTPATIENT)
Dept: HEMATOLOGY ONCOLOGY | Facility: MEDICAL CENTER | Age: 84
End: 2019-03-13

## 2019-03-13 ENCOUNTER — PATIENT OUTREACH (OUTPATIENT)
Dept: OTHER | Facility: MEDICAL CENTER | Age: 84
End: 2019-03-13

## 2019-03-13 NOTE — LETTER
[facility]   [address]  [place] [address]  Phone: [phone] - Fax: [phone]              Akua Wheeler  [place] [address]     Date: 03/13/19    Dear Akua,      I am a Cancer Nurse Navigator, a certified oncology nurse. My role is to assess any needs you may have with education, guidance and support. I am available to you and your family from diagnosis through your survivorship.       I am available to address your needs during your journey with the following services:     Care Coordination  I can assist you in facilitating communication between your cancer care treatment team to ensure timely treatment and follow-up.  I can also assist with transition of care back to your primary care provider, or other specialist, as needed.  My goal is to bridge gaps for you throughout the course of your active treatment.       Education Services  Understanding the recommended treatment course by your physician is key. I can provide educational resources personalized to your cancer diagnosis to help you understand your diagnosis and treatment. Please let me know if you would like to receive information about your diagnosis and treatment plan.  I am here to help.     Support Services/Resource Information  [facility] we offer a full scope of support services.  I can assist you with referral information to:  · Cancer Clinical Trials & Research  · Nutrition counseling  · Support groups  · Complementary Therapies such as Healing Touch and Mind-Body Techniques Meditation  · Patient Financial Advocates  ·   · [facility], an [employer] affiliate office, our volunteers can assist you with accessing our [employer] library, support services information, head coverings and comfort items  · Community and national resources, included eligibility based sergei assistance and pharmaceutical access programs, if you are in need of  additional information.     [facility] offers services that include:  · Behavioral Health  · Genetic counseling & testing  · Acupuncture  · Lymphedema prevention/treatment program  · Palliative care services.       Our care team includes a Survivorship Nurse Navigator, who meets with you after your treatment is completed to review your survivorship care plan and treatment summary.      I hope you have an excellent patient experience.  Please feel free to share with me your comments regarding the care you have received- we value your feedback.    Sincerely,     Yeny Wheeler R.N.  Cancer Nurse Navigator    Main: [phone]   Office:  [phone]  Email:  [email or web address]

## 2019-03-13 NOTE — TELEPHONE ENCOUNTER
"Received call from patient, she stated she would like to cancel her NP Oncology appointment with Dr. Castillo on 3/15/19. I asked her if she would like to reschedule she said \"not at this time.\" I kindly asked the patient what was the reasoning for the cancellation so I could document it and she stated \"I have made other arrangements.\" I informed Dr. Castillo of this patient cancelling her appointment.   "

## 2019-03-14 PROCEDURE — 77295 3-D RADIOTHERAPY PLAN: CPT | Mod: 26 | Performed by: RADIOLOGY

## 2019-03-14 PROCEDURE — 77295 3-D RADIOTHERAPY PLAN: CPT | Performed by: RADIOLOGY

## 2019-03-14 PROCEDURE — 77300 RADIATION THERAPY DOSE PLAN: CPT | Performed by: RADIOLOGY

## 2019-03-14 PROCEDURE — 77334 RADIATION TREATMENT AID(S): CPT | Performed by: RADIOLOGY

## 2019-03-14 PROCEDURE — 77300 RADIATION THERAPY DOSE PLAN: CPT | Mod: 26 | Performed by: RADIOLOGY

## 2019-03-14 PROCEDURE — 77334 RADIATION TREATMENT AID(S): CPT | Mod: 26 | Performed by: RADIOLOGY

## 2019-03-14 NOTE — TELEPHONE ENCOUNTER
Upon further review I noticed this patient had oral chemo ordered by Dr. Castillo. Claire CHACON RN is cancelling oral chemo order. Dr. Castillo notified.

## 2019-03-14 NOTE — TELEPHONE ENCOUNTER
RN called and spoke with [facility] and notified them that the RX needs to be canceled because patient states she has made other arrangements and that she will not be establishing with in our clinic

## 2019-03-15 ENCOUNTER — APPOINTMENT (OUTPATIENT)
Dept: HEMATOLOGY ONCOLOGY | Facility: MEDICAL CENTER | Age: 84
End: 2019-03-15
Payer: MEDICARE

## 2019-03-20 ENCOUNTER — PATIENT OUTREACH (OUTPATIENT)
Dept: OTHER | Facility: MEDICAL CENTER | Age: 84
End: 2019-03-20

## 2019-03-20 NOTE — PROGRESS NOTES
"Follow up call from cancer nurse navigation that was sent out.  Introduced self and reviewed role of nurse navigator.  Pt reports no needs at this time but did say \"I've never been through chemo or radiation before\".  Pt starts radiation next week.  Discussed that navigator is available as added support for her.  Provided information on groups and classes available.  Will try and follow up with patient next week, while she is in radiation.  "

## 2019-03-25 ENCOUNTER — HOSPITAL ENCOUNTER (OUTPATIENT)
Dept: RADIATION ONCOLOGY | Facility: MEDICAL CENTER | Age: 84
End: 2019-03-25

## 2019-03-25 LAB
CHEMOTHERAPY INFUSION START DATE: NORMAL
CHEMOTHERAPY RECORDS: 2.67
CHEMOTHERAPY RECORDS: 4005
CHEMOTHERAPY RECORDS: NORMAL
CHEMOTHERAPY RX CANCER: NORMAL
RAD ONC ARIA COURSE TREATMENT ELAPSED DAYS: NORMAL
RAD ONC ARIA PLAN TREATMENT DATES: NORMAL
RAD ONC ARIA REFERENCE POINT DOSAGE GIVEN TO DATE: 2.67
RAD ONC ARIA REFERENCE POINT DOSAGE GIVEN TO DATE: 2.67
RAD ONC ARIA REFERENCE POINT ID: NORMAL
RAD ONC ARIA REFERENCE POINT ID: NORMAL
RAD ONC ARIA REFERENCE POINT SESSION DOSAGE GIVEN: 2.67
RAD ONC ARIA REFERENCE POINT SESSION DOSAGE GIVEN: 2.67

## 2019-03-25 PROCEDURE — 77280 THER RAD SIMULAJ FIELD SMPL: CPT | Mod: 26 | Performed by: RADIOLOGY

## 2019-03-25 PROCEDURE — 77280 THER RAD SIMULAJ FIELD SMPL: CPT | Performed by: RADIOLOGY

## 2019-03-25 PROCEDURE — 77412 RADIATION TX DELIVERY LVL 3: CPT | Performed by: RADIOLOGY

## 2019-03-26 ENCOUNTER — HOSPITAL ENCOUNTER (OUTPATIENT)
Dept: RADIATION ONCOLOGY | Facility: MEDICAL CENTER | Age: 84
End: 2019-03-26

## 2019-03-26 LAB
CHEMOTHERAPY INFUSION START DATE: NORMAL
CHEMOTHERAPY RECORDS: 2.67
CHEMOTHERAPY RECORDS: 4005
CHEMOTHERAPY RECORDS: NORMAL
CHEMOTHERAPY RX CANCER: NORMAL
RAD ONC ARIA COURSE TREATMENT ELAPSED DAYS: NORMAL
RAD ONC ARIA PLAN TREATMENT DATES: NORMAL
RAD ONC ARIA REFERENCE POINT DOSAGE GIVEN TO DATE: 5.34
RAD ONC ARIA REFERENCE POINT DOSAGE GIVEN TO DATE: 5.34
RAD ONC ARIA REFERENCE POINT ID: NORMAL
RAD ONC ARIA REFERENCE POINT ID: NORMAL
RAD ONC ARIA REFERENCE POINT SESSION DOSAGE GIVEN: 2.67
RAD ONC ARIA REFERENCE POINT SESSION DOSAGE GIVEN: 2.67

## 2019-03-26 PROCEDURE — 77014 PR CT GUIDANCE PLACEMENT RAD THERAPY FIELDS: CPT | Mod: 26 | Performed by: RADIOLOGY

## 2019-03-26 PROCEDURE — 77387 GUIDANCE FOR RADJ TX DLVR: CPT | Performed by: RADIOLOGY

## 2019-03-26 PROCEDURE — 77412 RADIATION TX DELIVERY LVL 3: CPT | Performed by: RADIOLOGY

## 2019-03-27 LAB
CHEMOTHERAPY INFUSION START DATE: NORMAL
CHEMOTHERAPY RECORDS: 2.67
CHEMOTHERAPY RECORDS: 4005
CHEMOTHERAPY RECORDS: NORMAL
CHEMOTHERAPY RX CANCER: NORMAL
RAD ONC ARIA COURSE TREATMENT ELAPSED DAYS: NORMAL
RAD ONC ARIA PLAN TREATMENT DATES: NORMAL
RAD ONC ARIA REFERENCE POINT DOSAGE GIVEN TO DATE: 8
RAD ONC ARIA REFERENCE POINT DOSAGE GIVEN TO DATE: 8.01
RAD ONC ARIA REFERENCE POINT ID: NORMAL
RAD ONC ARIA REFERENCE POINT ID: NORMAL
RAD ONC ARIA REFERENCE POINT SESSION DOSAGE GIVEN: 2.67
RAD ONC ARIA REFERENCE POINT SESSION DOSAGE GIVEN: 2.67

## 2019-03-27 PROCEDURE — 77336 RADIATION PHYSICS CONSULT: CPT | Performed by: RADIOLOGY

## 2019-03-27 PROCEDURE — 77387 GUIDANCE FOR RADJ TX DLVR: CPT | Performed by: RADIOLOGY

## 2019-03-27 PROCEDURE — 77412 RADIATION TX DELIVERY LVL 3: CPT | Performed by: RADIOLOGY

## 2019-03-27 PROCEDURE — 77014 PR CT GUIDANCE PLACEMENT RAD THERAPY FIELDS: CPT | Mod: 26 | Performed by: RADIOLOGY

## 2019-03-28 ENCOUNTER — HOSPITAL ENCOUNTER (OUTPATIENT)
Dept: RADIATION ONCOLOGY | Facility: MEDICAL CENTER | Age: 84
End: 2019-03-28

## 2019-03-28 LAB
CHEMOTHERAPY INFUSION START DATE: NORMAL
CHEMOTHERAPY RECORDS: 2.67
CHEMOTHERAPY RECORDS: 4005
CHEMOTHERAPY RECORDS: NORMAL
CHEMOTHERAPY RX CANCER: NORMAL
RAD ONC ARIA COURSE TREATMENT ELAPSED DAYS: NORMAL
RAD ONC ARIA PLAN TREATMENT DATES: NORMAL
RAD ONC ARIA REFERENCE POINT DOSAGE GIVEN TO DATE: 10.67
RAD ONC ARIA REFERENCE POINT DOSAGE GIVEN TO DATE: 10.68
RAD ONC ARIA REFERENCE POINT ID: NORMAL
RAD ONC ARIA REFERENCE POINT ID: NORMAL
RAD ONC ARIA REFERENCE POINT SESSION DOSAGE GIVEN: 2.67
RAD ONC ARIA REFERENCE POINT SESSION DOSAGE GIVEN: 2.67

## 2019-03-28 PROCEDURE — 77412 RADIATION TX DELIVERY LVL 3: CPT | Performed by: RADIOLOGY

## 2019-03-28 PROCEDURE — 77387 GUIDANCE FOR RADJ TX DLVR: CPT | Performed by: RADIOLOGY

## 2019-03-28 PROCEDURE — 77014 PR CT GUIDANCE PLACEMENT RAD THERAPY FIELDS: CPT | Mod: 26 | Performed by: RADIOLOGY

## 2019-03-29 ENCOUNTER — HOSPITAL ENCOUNTER (OUTPATIENT)
Dept: RADIATION ONCOLOGY | Facility: MEDICAL CENTER | Age: 84
End: 2019-03-29

## 2019-03-29 LAB
CHEMOTHERAPY INFUSION START DATE: NORMAL
CHEMOTHERAPY RECORDS: 2.67
CHEMOTHERAPY RECORDS: 4005
CHEMOTHERAPY RECORDS: NORMAL
CHEMOTHERAPY RX CANCER: NORMAL
RAD ONC ARIA COURSE TREATMENT ELAPSED DAYS: NORMAL
RAD ONC ARIA PLAN TREATMENT DATES: NORMAL
RAD ONC ARIA REFERENCE POINT DOSAGE GIVEN TO DATE: 13.34
RAD ONC ARIA REFERENCE POINT DOSAGE GIVEN TO DATE: 13.35
RAD ONC ARIA REFERENCE POINT ID: NORMAL
RAD ONC ARIA REFERENCE POINT ID: NORMAL
RAD ONC ARIA REFERENCE POINT SESSION DOSAGE GIVEN: 2.67
RAD ONC ARIA REFERENCE POINT SESSION DOSAGE GIVEN: 2.67

## 2019-03-29 PROCEDURE — 77387 GUIDANCE FOR RADJ TX DLVR: CPT | Performed by: RADIOLOGY

## 2019-03-29 PROCEDURE — 77014 PR CT GUIDANCE PLACEMENT RAD THERAPY FIELDS: CPT | Mod: 26 | Performed by: RADIOLOGY

## 2019-03-29 PROCEDURE — 77427 RADIATION TX MANAGEMENT X5: CPT | Performed by: RADIOLOGY

## 2019-03-29 PROCEDURE — 77412 RADIATION TX DELIVERY LVL 3: CPT | Performed by: RADIOLOGY

## 2019-04-01 ENCOUNTER — HOSPITAL ENCOUNTER (OUTPATIENT)
Dept: RADIATION ONCOLOGY | Facility: MEDICAL CENTER | Age: 84
End: 2019-04-01

## 2019-04-01 ENCOUNTER — HOSPITAL ENCOUNTER (OUTPATIENT)
Dept: RADIATION ONCOLOGY | Facility: MEDICAL CENTER | Age: 84
End: 2019-04-30
Attending: RADIOLOGY
Payer: MEDICARE

## 2019-04-01 LAB
CHEMOTHERAPY INFUSION START DATE: NORMAL
CHEMOTHERAPY RECORDS: 2.67
CHEMOTHERAPY RECORDS: 4005
CHEMOTHERAPY RECORDS: NORMAL
CHEMOTHERAPY RX CANCER: NORMAL
RAD ONC ARIA COURSE TREATMENT ELAPSED DAYS: NORMAL
RAD ONC ARIA PLAN TREATMENT DATES: NORMAL
RAD ONC ARIA REFERENCE POINT DOSAGE GIVEN TO DATE: 16.01
RAD ONC ARIA REFERENCE POINT DOSAGE GIVEN TO DATE: 16.02
RAD ONC ARIA REFERENCE POINT ID: NORMAL
RAD ONC ARIA REFERENCE POINT ID: NORMAL
RAD ONC ARIA REFERENCE POINT SESSION DOSAGE GIVEN: 2.67
RAD ONC ARIA REFERENCE POINT SESSION DOSAGE GIVEN: 2.67

## 2019-04-01 PROCEDURE — 77387 GUIDANCE FOR RADJ TX DLVR: CPT | Performed by: RADIOLOGY

## 2019-04-01 PROCEDURE — 77014 PR CT GUIDANCE PLACEMENT RAD THERAPY FIELDS: CPT | Mod: 26 | Performed by: RADIOLOGY

## 2019-04-01 PROCEDURE — 77412 RADIATION TX DELIVERY LVL 3: CPT | Performed by: RADIOLOGY

## 2019-04-02 ENCOUNTER — HOSPITAL ENCOUNTER (OUTPATIENT)
Dept: RADIATION ONCOLOGY | Facility: MEDICAL CENTER | Age: 84
End: 2019-04-02

## 2019-04-02 LAB
CHEMOTHERAPY INFUSION START DATE: NORMAL
CHEMOTHERAPY RECORDS: 2.67
CHEMOTHERAPY RECORDS: 4005
CHEMOTHERAPY RECORDS: NORMAL
CHEMOTHERAPY RX CANCER: NORMAL
RAD ONC ARIA COURSE TREATMENT ELAPSED DAYS: NORMAL
RAD ONC ARIA PLAN TREATMENT DATES: NORMAL
RAD ONC ARIA REFERENCE POINT DOSAGE GIVEN TO DATE: 18.67
RAD ONC ARIA REFERENCE POINT DOSAGE GIVEN TO DATE: 18.69
RAD ONC ARIA REFERENCE POINT ID: NORMAL
RAD ONC ARIA REFERENCE POINT ID: NORMAL
RAD ONC ARIA REFERENCE POINT SESSION DOSAGE GIVEN: 2.67
RAD ONC ARIA REFERENCE POINT SESSION DOSAGE GIVEN: 2.67

## 2019-04-02 PROCEDURE — 77412 RADIATION TX DELIVERY LVL 3: CPT | Performed by: RADIOLOGY

## 2019-04-02 PROCEDURE — 77014 PR CT GUIDANCE PLACEMENT RAD THERAPY FIELDS: CPT | Mod: 26 | Performed by: RADIOLOGY

## 2019-04-02 PROCEDURE — 77387 GUIDANCE FOR RADJ TX DLVR: CPT | Performed by: RADIOLOGY

## 2019-04-03 LAB
CHEMOTHERAPY INFUSION START DATE: NORMAL
CHEMOTHERAPY RECORDS: 2.67
CHEMOTHERAPY RECORDS: 4005
CHEMOTHERAPY RECORDS: NORMAL
CHEMOTHERAPY RX CANCER: NORMAL
RAD ONC ARIA COURSE TREATMENT ELAPSED DAYS: NORMAL
RAD ONC ARIA PLAN TREATMENT DATES: NORMAL
RAD ONC ARIA REFERENCE POINT DOSAGE GIVEN TO DATE: 21.34
RAD ONC ARIA REFERENCE POINT DOSAGE GIVEN TO DATE: 21.36
RAD ONC ARIA REFERENCE POINT ID: NORMAL
RAD ONC ARIA REFERENCE POINT ID: NORMAL
RAD ONC ARIA REFERENCE POINT SESSION DOSAGE GIVEN: 2.67
RAD ONC ARIA REFERENCE POINT SESSION DOSAGE GIVEN: 2.67

## 2019-04-03 PROCEDURE — 77336 RADIATION PHYSICS CONSULT: CPT | Performed by: RADIOLOGY

## 2019-04-03 PROCEDURE — 77014 PR CT GUIDANCE PLACEMENT RAD THERAPY FIELDS: CPT | Mod: 26 | Performed by: RADIOLOGY

## 2019-04-03 PROCEDURE — 77412 RADIATION TX DELIVERY LVL 3: CPT | Performed by: RADIOLOGY

## 2019-04-03 PROCEDURE — 77387 GUIDANCE FOR RADJ TX DLVR: CPT | Performed by: RADIOLOGY

## 2019-04-04 ENCOUNTER — HOSPITAL ENCOUNTER (OUTPATIENT)
Dept: RADIATION ONCOLOGY | Facility: MEDICAL CENTER | Age: 84
End: 2019-04-04

## 2019-04-04 LAB
CHEMOTHERAPY INFUSION START DATE: NORMAL
CHEMOTHERAPY RECORDS: 2.67
CHEMOTHERAPY RECORDS: 4005
CHEMOTHERAPY RECORDS: NORMAL
CHEMOTHERAPY RX CANCER: NORMAL
RAD ONC ARIA COURSE TREATMENT ELAPSED DAYS: NORMAL
RAD ONC ARIA PLAN TREATMENT DATES: NORMAL
RAD ONC ARIA REFERENCE POINT DOSAGE GIVEN TO DATE: 24.01
RAD ONC ARIA REFERENCE POINT DOSAGE GIVEN TO DATE: 24.03
RAD ONC ARIA REFERENCE POINT ID: NORMAL
RAD ONC ARIA REFERENCE POINT ID: NORMAL
RAD ONC ARIA REFERENCE POINT SESSION DOSAGE GIVEN: 2.67
RAD ONC ARIA REFERENCE POINT SESSION DOSAGE GIVEN: 2.67

## 2019-04-04 PROCEDURE — 77387 GUIDANCE FOR RADJ TX DLVR: CPT | Performed by: RADIOLOGY

## 2019-04-04 PROCEDURE — 77014 PR CT GUIDANCE PLACEMENT RAD THERAPY FIELDS: CPT | Mod: 26 | Performed by: RADIOLOGY

## 2019-04-04 PROCEDURE — 77412 RADIATION TX DELIVERY LVL 3: CPT | Performed by: RADIOLOGY

## 2019-04-05 ENCOUNTER — PATIENT OUTREACH (OUTPATIENT)
Dept: OTHER | Facility: MEDICAL CENTER | Age: 84
End: 2019-04-05

## 2019-04-05 ENCOUNTER — HOSPITAL ENCOUNTER (OUTPATIENT)
Dept: RADIATION ONCOLOGY | Facility: MEDICAL CENTER | Age: 84
End: 2019-04-05

## 2019-04-05 LAB
CHEMOTHERAPY INFUSION START DATE: NORMAL
CHEMOTHERAPY RECORDS: 2.67
CHEMOTHERAPY RECORDS: 4005
CHEMOTHERAPY RECORDS: NORMAL
CHEMOTHERAPY RX CANCER: NORMAL
RAD ONC ARIA COURSE TREATMENT ELAPSED DAYS: NORMAL
RAD ONC ARIA PLAN TREATMENT DATES: NORMAL
RAD ONC ARIA REFERENCE POINT DOSAGE GIVEN TO DATE: 26.68
RAD ONC ARIA REFERENCE POINT DOSAGE GIVEN TO DATE: 26.7
RAD ONC ARIA REFERENCE POINT ID: NORMAL
RAD ONC ARIA REFERENCE POINT ID: NORMAL
RAD ONC ARIA REFERENCE POINT SESSION DOSAGE GIVEN: 2.67
RAD ONC ARIA REFERENCE POINT SESSION DOSAGE GIVEN: 2.67

## 2019-04-05 PROCEDURE — 77014 PR CT GUIDANCE PLACEMENT RAD THERAPY FIELDS: CPT | Mod: 26 | Performed by: RADIOLOGY

## 2019-04-05 PROCEDURE — 77427 RADIATION TX MANAGEMENT X5: CPT | Performed by: RADIOLOGY

## 2019-04-05 PROCEDURE — 77387 GUIDANCE FOR RADJ TX DLVR: CPT | Performed by: RADIOLOGY

## 2019-04-05 PROCEDURE — 77412 RADIATION TX DELIVERY LVL 3: CPT | Performed by: RADIOLOGY

## 2019-04-05 NOTE — PROGRESS NOTES
"Met with patient and daughter after radiation therapy.  Pt reports is doing better then last week and is doing \"ok\".  Provided information on classes, groups and healing touch.  Pt states does have advance directive, encouraged her to bring in so we can copy and place on file.  Pt and daughter deny current questions.  Pt states is tolerated treatment.  Available as needed.  "

## 2019-04-08 ENCOUNTER — HOSPITAL ENCOUNTER (OUTPATIENT)
Dept: RADIATION ONCOLOGY | Facility: MEDICAL CENTER | Age: 84
End: 2019-04-08

## 2019-04-08 LAB
CHEMOTHERAPY INFUSION START DATE: NORMAL
CHEMOTHERAPY RECORDS: 2.67
CHEMOTHERAPY RECORDS: 4005
CHEMOTHERAPY RECORDS: NORMAL
CHEMOTHERAPY RX CANCER: NORMAL
RAD ONC ARIA COURSE TREATMENT ELAPSED DAYS: NORMAL
RAD ONC ARIA PLAN TREATMENT DATES: NORMAL
RAD ONC ARIA REFERENCE POINT DOSAGE GIVEN TO DATE: 29.34
RAD ONC ARIA REFERENCE POINT DOSAGE GIVEN TO DATE: 29.37
RAD ONC ARIA REFERENCE POINT ID: NORMAL
RAD ONC ARIA REFERENCE POINT ID: NORMAL
RAD ONC ARIA REFERENCE POINT SESSION DOSAGE GIVEN: 2.67
RAD ONC ARIA REFERENCE POINT SESSION DOSAGE GIVEN: 2.67

## 2019-04-08 PROCEDURE — 77387 GUIDANCE FOR RADJ TX DLVR: CPT | Performed by: RADIOLOGY

## 2019-04-08 PROCEDURE — 77412 RADIATION TX DELIVERY LVL 3: CPT | Performed by: RADIOLOGY

## 2019-04-08 PROCEDURE — 77014 PR CT GUIDANCE PLACEMENT RAD THERAPY FIELDS: CPT | Mod: 26 | Performed by: RADIOLOGY

## 2019-04-09 ENCOUNTER — HOSPITAL ENCOUNTER (OUTPATIENT)
Dept: RADIATION ONCOLOGY | Facility: MEDICAL CENTER | Age: 84
End: 2019-04-09

## 2019-04-09 LAB
CHEMOTHERAPY INFUSION START DATE: NORMAL
CHEMOTHERAPY RECORDS: 2.67
CHEMOTHERAPY RECORDS: 4005
CHEMOTHERAPY RECORDS: NORMAL
CHEMOTHERAPY RX CANCER: NORMAL
RAD ONC ARIA COURSE TREATMENT ELAPSED DAYS: NORMAL
RAD ONC ARIA PLAN TREATMENT DATES: NORMAL
RAD ONC ARIA REFERENCE POINT DOSAGE GIVEN TO DATE: 32.01
RAD ONC ARIA REFERENCE POINT DOSAGE GIVEN TO DATE: 32.04
RAD ONC ARIA REFERENCE POINT ID: NORMAL
RAD ONC ARIA REFERENCE POINT ID: NORMAL
RAD ONC ARIA REFERENCE POINT SESSION DOSAGE GIVEN: 2.67
RAD ONC ARIA REFERENCE POINT SESSION DOSAGE GIVEN: 2.67

## 2019-04-09 PROCEDURE — 77014 PR CT GUIDANCE PLACEMENT RAD THERAPY FIELDS: CPT | Mod: 26 | Performed by: RADIOLOGY

## 2019-04-09 PROCEDURE — 77412 RADIATION TX DELIVERY LVL 3: CPT | Performed by: RADIOLOGY

## 2019-04-09 PROCEDURE — 77387 GUIDANCE FOR RADJ TX DLVR: CPT | Performed by: RADIOLOGY

## 2019-04-10 LAB
CHEMOTHERAPY INFUSION START DATE: NORMAL
CHEMOTHERAPY RECORDS: 2.67
CHEMOTHERAPY RECORDS: 4005
CHEMOTHERAPY RECORDS: NORMAL
CHEMOTHERAPY RX CANCER: NORMAL
RAD ONC ARIA COURSE TREATMENT ELAPSED DAYS: NORMAL
RAD ONC ARIA PLAN TREATMENT DATES: NORMAL
RAD ONC ARIA REFERENCE POINT DOSAGE GIVEN TO DATE: 34.68
RAD ONC ARIA REFERENCE POINT DOSAGE GIVEN TO DATE: 34.71
RAD ONC ARIA REFERENCE POINT ID: NORMAL
RAD ONC ARIA REFERENCE POINT ID: NORMAL
RAD ONC ARIA REFERENCE POINT SESSION DOSAGE GIVEN: 2.67
RAD ONC ARIA REFERENCE POINT SESSION DOSAGE GIVEN: 2.67

## 2019-04-10 PROCEDURE — 77387 GUIDANCE FOR RADJ TX DLVR: CPT | Performed by: RADIOLOGY

## 2019-04-10 PROCEDURE — 77336 RADIATION PHYSICS CONSULT: CPT | Performed by: RADIOLOGY

## 2019-04-10 PROCEDURE — 77412 RADIATION TX DELIVERY LVL 3: CPT | Performed by: RADIOLOGY

## 2019-04-10 PROCEDURE — 77014 PR CT GUIDANCE PLACEMENT RAD THERAPY FIELDS: CPT | Mod: 26 | Performed by: RADIOLOGY

## 2019-04-11 ENCOUNTER — HOSPITAL ENCOUNTER (OUTPATIENT)
Dept: RADIATION ONCOLOGY | Facility: MEDICAL CENTER | Age: 84
End: 2019-04-11

## 2019-04-11 LAB
CHEMOTHERAPY INFUSION START DATE: NORMAL
CHEMOTHERAPY RECORDS: 2.67
CHEMOTHERAPY RECORDS: 4005
CHEMOTHERAPY RECORDS: NORMAL
CHEMOTHERAPY RX CANCER: NORMAL
RAD ONC ARIA COURSE TREATMENT ELAPSED DAYS: NORMAL
RAD ONC ARIA PLAN TREATMENT DATES: NORMAL
RAD ONC ARIA REFERENCE POINT DOSAGE GIVEN TO DATE: 37.35
RAD ONC ARIA REFERENCE POINT DOSAGE GIVEN TO DATE: 37.38
RAD ONC ARIA REFERENCE POINT ID: NORMAL
RAD ONC ARIA REFERENCE POINT ID: NORMAL
RAD ONC ARIA REFERENCE POINT SESSION DOSAGE GIVEN: 2.67
RAD ONC ARIA REFERENCE POINT SESSION DOSAGE GIVEN: 2.67

## 2019-04-11 PROCEDURE — 77014 PR CT GUIDANCE PLACEMENT RAD THERAPY FIELDS: CPT | Mod: 26 | Performed by: RADIOLOGY

## 2019-04-11 PROCEDURE — 77412 RADIATION TX DELIVERY LVL 3: CPT | Performed by: RADIOLOGY

## 2019-04-11 PROCEDURE — 77387 GUIDANCE FOR RADJ TX DLVR: CPT | Performed by: RADIOLOGY

## 2019-04-12 ENCOUNTER — HOSPITAL ENCOUNTER (OUTPATIENT)
Dept: RADIATION ONCOLOGY | Facility: MEDICAL CENTER | Age: 84
End: 2019-04-12

## 2019-04-12 LAB
CHEMOTHERAPY INFUSION START DATE: NORMAL
CHEMOTHERAPY RECORDS: 2.67
CHEMOTHERAPY RECORDS: 4005
CHEMOTHERAPY RECORDS: NORMAL
CHEMOTHERAPY RX CANCER: NORMAL
RAD ONC ARIA COURSE TREATMENT ELAPSED DAYS: NORMAL
RAD ONC ARIA PLAN TREATMENT DATES: NORMAL
RAD ONC ARIA REFERENCE POINT DOSAGE GIVEN TO DATE: 40.02
RAD ONC ARIA REFERENCE POINT DOSAGE GIVEN TO DATE: 40.05
RAD ONC ARIA REFERENCE POINT ID: NORMAL
RAD ONC ARIA REFERENCE POINT ID: NORMAL
RAD ONC ARIA REFERENCE POINT SESSION DOSAGE GIVEN: 2.67
RAD ONC ARIA REFERENCE POINT SESSION DOSAGE GIVEN: 2.67

## 2019-04-12 PROCEDURE — 77014 PR CT GUIDANCE PLACEMENT RAD THERAPY FIELDS: CPT | Mod: 26 | Performed by: RADIOLOGY

## 2019-04-12 PROCEDURE — 77387 GUIDANCE FOR RADJ TX DLVR: CPT | Performed by: RADIOLOGY

## 2019-04-12 PROCEDURE — 77427 RADIATION TX MANAGEMENT X5: CPT | Performed by: RADIOLOGY

## 2019-04-12 PROCEDURE — 77412 RADIATION TX DELIVERY LVL 3: CPT | Performed by: RADIOLOGY

## 2019-04-15 LAB
CHEMOTHERAPY INFUSION START DATE: NORMAL
CHEMOTHERAPY RECORDS: 2.67
CHEMOTHERAPY RECORDS: 4005
CHEMOTHERAPY RECORDS: NORMAL
CHEMOTHERAPY RX CANCER: NORMAL
RAD ONC ARIA COURSE TREATMENT ELAPSED DAYS: NORMAL
RAD ONC ARIA PLAN TREATMENT DATES: NORMAL
RAD ONC ARIA REFERENCE POINT DOSAGE GIVEN TO DATE: 40.02
RAD ONC ARIA REFERENCE POINT DOSAGE GIVEN TO DATE: 40.05
RAD ONC ARIA REFERENCE POINT ID: NORMAL
RAD ONC ARIA REFERENCE POINT ID: NORMAL

## 2019-05-14 ENCOUNTER — HOSPITAL ENCOUNTER (OUTPATIENT)
Dept: RADIOLOGY | Facility: MEDICAL CENTER | Age: 84
End: 2019-05-14
Attending: NEUROLOGICAL SURGERY
Payer: MEDICARE

## 2019-05-14 DIAGNOSIS — D49.6 BRAIN NEOPLASM (HCC): ICD-10-CM

## 2019-05-14 DIAGNOSIS — R25.2 SPASTICITY: ICD-10-CM

## 2019-05-14 PROCEDURE — A9585 GADOBUTROL INJECTION: HCPCS | Performed by: INTERNAL MEDICINE

## 2019-05-14 PROCEDURE — 700117 HCHG RX CONTRAST REV CODE 255: Performed by: INTERNAL MEDICINE

## 2019-05-14 PROCEDURE — 70553 MRI BRAIN STEM W/O & W/DYE: CPT

## 2019-05-14 RX ORDER — GADOBUTROL 604.72 MG/ML
5 INJECTION INTRAVENOUS ONCE
Status: COMPLETED | OUTPATIENT
Start: 2019-05-14 | End: 2019-05-14

## 2019-05-14 RX ADMIN — GADOBUTROL 5 ML: 604.72 INJECTION INTRAVENOUS at 16:25

## 2019-05-14 NOTE — TELEPHONE ENCOUNTER
Was the patient seen in the last year in this department? Yes    Does patient have an active prescription for medications requested? Yes last seen by dr Bloch 11/27/18    Received Request Via: Pharmacy

## 2019-05-19 RX ORDER — CLONAZEPAM 0.5 MG/1
TABLET ORAL
Qty: 60 TAB | Refills: 3 | Status: SHIPPED | OUTPATIENT
Start: 2019-05-19 | End: 2019-06-18

## 2019-05-23 ENCOUNTER — HOSPITAL ENCOUNTER (OUTPATIENT)
Dept: RADIATION ONCOLOGY | Facility: MEDICAL CENTER | Age: 84
End: 2019-05-31
Attending: RADIOLOGY
Payer: MEDICARE

## 2019-05-23 VITALS
OXYGEN SATURATION: 97 % | DIASTOLIC BLOOD PRESSURE: 71 MMHG | TEMPERATURE: 97.5 F | WEIGHT: 112.6 LBS | BODY MASS INDEX: 20.59 KG/M2 | SYSTOLIC BLOOD PRESSURE: 124 MMHG | HEART RATE: 77 BPM

## 2019-05-23 PROCEDURE — 99212 OFFICE O/P EST SF 10 MIN: CPT | Performed by: RADIOLOGY

## 2019-05-23 ASSESSMENT — PAIN SCALES - GENERAL: PAINLEVEL: NO PAIN

## 2019-05-23 NOTE — PROGRESS NOTES
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 Wheeler today in follow up for her brain tumor.  Patient continues to have difficulties maintaining independence.  Her performance status continues to struggle.  Therefore she is in the process of selling her home and moving in with her daughter.  She denies any headaches or focal neurologic symptoms.  Her neurocognitive changes appear to be more global.  She just started her adjuvant Temodar this week.  She feels she is tolerating it well thus far.  Her most recent MRI scan shows changes that could be consistent with treatment effect or persistent tumor.    PHYSICAL EXAM:    Vitals:    05/23/19 0942   BP: 124/71   Pulse: 77   Temp: 36.4 °C (97.5 °F)   SpO2: 97%   Weight: 51.1 kg (112 lb 9.6 oz)   Pain Score: No pain      2= Ambulatory and capable of all self care, but unable to carry out any work activities.  Up and about more than 50% of waking hours.      GENERAL: No apparent distress.  HEENT:  Pupils are equal, round, and reactive to light.  Extraocular muscles   are intact. Sclerae nonicteric.  Conjunctivae pink.  Oral cavity, tongue   protrudes midline.   NECK:  Supple without evidence of thyromegaly.  NODES:  No peripheral adenopathy of the neck, supraclavicular fossa or axillae   bilaterally.  LUNGS:  Clear to ascultation bilaterally   HEART:  Regular rate and rhythm.  No murmur appreciated  ABDOMEN:  Soft. No evidence of hepatosplenomegaly.  Positive bowel sounds.  EXTREMITIES:  Without Edema.  NEUROLOGIC:  Cranial nerves II through XII were intact. 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  Patient will continue with her adjuvant Temodar.  I went over the results with she and her daughter.  I discussed that I am concerned given her performance status the these findings could represent still persistent disease.  I did entertain however that there would be some of these changes that are consistent with posttreatment gliosis.  I did emphasize the importance of continuing with her adjuvant Temodar based on these findings however.  Since she only started her adjuvant Temodar this week I would not recommend any introduction of Avastin or other therapies at this time.    If patient has any questions or concerns, she should feel free to contact me.

## 2019-05-23 NOTE — NON-PROVIDER
Patient was seen today in clinic with Dr. Cabrales for Follow up.  Vitals signs and weight were obtained and pain assessment was completed.  Allergies and medications were reviewed with the patient.  Toxicities of treatment assessed.     Vitals/Pain:  Vitals:    05/23/19 0942   BP: 124/71   Pulse: 77   Temp: 36.4 °C (97.5 °F)   SpO2: 97%   Weight: 51.1 kg (112 lb 9.6 oz)   Pain Score: No pain        Allergies:   Actifed [triprolidine-pse]; Bactrim; Epinephrine; Flagyl [metronidazole hcl]; Hydrocortisone; Pcn [penicillins]; Primidone; and Propranolol    Current Medications:  Current Outpatient Prescriptions   Medication Sig Dispense Refill   • clonazePAM (KLONOPIN) 0.5 MG Tab TAKE 1 TABLET BY MOUTH TWICE A DAY AS DIRECTED **R25.1 60 Tab 3   • temozolomide (TEMODAR) 100 MG Cap 1 tab M-F with radiation for 3 weeks 15 Cap 0   • Non Formulary Request      • Cholecalciferol (VITAMIN D3) 5000 UNITS Cap Take 1 Cap by mouth every day.     • Omega-3 Fatty Acids (OMEGA 3 PO) Take  by mouth.     • Probiotic Product (SOLUBLE FIBER/PROBIOTICS PO) Take  by mouth.     • CALCIUM & MAGNESIUM CARBONATES PO Take  by mouth.     • calcium-vitamin D (OSCAL-250) 250-125 MG-UNIT TABS Take 1 Tab by mouth every day.       No current facility-administered medications for this encounter.          PCP:  Jhoan Abdi, Med Ass't

## 2025-04-28 NOTE — ASSESSMENT & PLAN NOTE
Pt has had an increase in the tremor on the left side. Pt has also had a change in her balance.  
Pt has noticed that her tremor in her left side at rest.  
Calm/Appropriate